# Patient Record
Sex: MALE | Race: WHITE | NOT HISPANIC OR LATINO | Employment: OTHER | URBAN - METROPOLITAN AREA
[De-identification: names, ages, dates, MRNs, and addresses within clinical notes are randomized per-mention and may not be internally consistent; named-entity substitution may affect disease eponyms.]

---

## 2017-06-28 ENCOUNTER — HOSPITAL ENCOUNTER (OUTPATIENT)
Dept: RADIOLOGY | Facility: HOSPITAL | Age: 62
Discharge: HOME | End: 2017-06-28
Attending: FAMILY MEDICINE
Payer: OTHER GOVERNMENT

## 2017-06-28 ENCOUNTER — OFFICE VISIT (OUTPATIENT)
Dept: INTERNAL MEDICINE | Facility: CLINIC | Age: 62
End: 2017-06-28
Payer: OTHER GOVERNMENT

## 2017-06-28 VITALS
DIASTOLIC BLOOD PRESSURE: 80 MMHG | BODY MASS INDEX: 33.55 KG/M2 | TEMPERATURE: 98 F | HEIGHT: 71 IN | SYSTOLIC BLOOD PRESSURE: 110 MMHG | HEART RATE: 71 BPM | OXYGEN SATURATION: 95 % | WEIGHT: 239.63 LBS

## 2017-06-28 DIAGNOSIS — M54.9 UPPER BACK PAIN ON LEFT SIDE: ICD-10-CM

## 2017-06-28 DIAGNOSIS — R20.2 PARESTHESIA OF LEFT ARM: ICD-10-CM

## 2017-06-28 DIAGNOSIS — L72.3 SEBACEOUS CYST: ICD-10-CM

## 2017-06-28 DIAGNOSIS — R20.2 PARESTHESIA OF LEFT ARM: Primary | ICD-10-CM

## 2017-06-28 PROCEDURE — 99214 OFFICE O/P EST MOD 30 MIN: CPT | Mod: S$PBB,,, | Performed by: FAMILY MEDICINE

## 2017-06-28 PROCEDURE — 99999 PR PBB SHADOW E&M-EST. PATIENT-LVL III: CPT | Mod: PBBFAC,,, | Performed by: FAMILY MEDICINE

## 2017-06-28 PROCEDURE — 72040 X-RAY EXAM NECK SPINE 2-3 VW: CPT | Mod: 26,,, | Performed by: RADIOLOGY

## 2017-06-28 PROCEDURE — 72040 X-RAY EXAM NECK SPINE 2-3 VW: CPT | Mod: TC

## 2017-06-28 PROCEDURE — 99213 OFFICE O/P EST LOW 20 MIN: CPT | Mod: PBBFAC,25 | Performed by: FAMILY MEDICINE

## 2017-06-28 RX ORDER — CYCLOBENZAPRINE HCL 10 MG
10 TABLET ORAL 3 TIMES DAILY PRN
Qty: 30 TABLET | Refills: 0 | Status: SHIPPED | OUTPATIENT
Start: 2017-06-28 | End: 2017-06-28 | Stop reason: SDUPTHER

## 2017-06-28 RX ORDER — NAPROXEN 500 MG/1
500 TABLET ORAL 2 TIMES DAILY WITH MEALS
Qty: 28 TABLET | Refills: 0 | Status: SHIPPED | OUTPATIENT
Start: 2017-06-28 | End: 2017-06-28 | Stop reason: SDUPTHER

## 2017-06-28 RX ORDER — CYCLOBENZAPRINE HCL 10 MG
10 TABLET ORAL 3 TIMES DAILY PRN
Qty: 30 TABLET | Refills: 0 | Status: SHIPPED | OUTPATIENT
Start: 2017-06-28 | End: 2017-07-08

## 2017-06-28 RX ORDER — MUPIROCIN 20 MG/G
OINTMENT TOPICAL 3 TIMES DAILY
Qty: 22 G | Refills: 0 | Status: SHIPPED | OUTPATIENT
Start: 2017-06-28 | End: 2017-06-28 | Stop reason: SDUPTHER

## 2017-06-28 RX ORDER — NAPROXEN 500 MG/1
500 TABLET ORAL 2 TIMES DAILY WITH MEALS
Qty: 28 TABLET | Refills: 0 | Status: SHIPPED | OUTPATIENT
Start: 2017-06-28 | End: 2017-07-12

## 2017-06-28 RX ORDER — MUPIROCIN 20 MG/G
OINTMENT TOPICAL 3 TIMES DAILY
Qty: 22 G | Refills: 0 | Status: SHIPPED | OUTPATIENT
Start: 2017-06-28

## 2017-06-30 NOTE — PROGRESS NOTES
"Subjective:       Patient ID: Grady Meza is a 61 y.o. male.    Chief Complaint: Shoulder Pain    Patient presents to clinic today reporting 1 month history of left shoulder pain (he indicates left upper back area). He has also noted paresthesias in the left arm. He states heat has helped, he has also tried TENS unit. He denies neck pain. He also reports "knot" on right buttock for last 3 months. He was able to squeeze blood tinged matter and states it is now smaller, but still present.      Review of Systems   Constitutional: Negative for chills, fatigue, fever and unexpected weight change.   Eyes: Negative for visual disturbance.   Respiratory: Negative for shortness of breath.    Cardiovascular: Negative for chest pain.   Musculoskeletal: Positive for arthralgias and myalgias. Negative for neck pain.   Neurological: Negative for headaches.       Objective:      Physical Exam   Constitutional: He appears well-developed and well-nourished. No distress.   HENT:   Head: Normocephalic and atraumatic.   Neck: Normal range of motion. Neck supple. Muscular tenderness present. No spinous process tenderness present. No neck rigidity. Normal range of motion present.       Skin:        Vitals reviewed.      Assessment:       1. Paresthesia of left arm    2. Upper back pain on left side    3. Sebaceous cyst        Plan:   Grady was seen today for shoulder pain.    Diagnoses and all orders for this visit:    Paresthesia of left arm  -     X-Ray Cervical Spine AP And Lateral; Future  -     Discontinue: naproxen (NAPROSYN) 500 MG tablet; Take 1 tablet (500 mg total) by mouth 2 (two) times daily with meals.  -     Discontinue: cyclobenzaprine (FLEXERIL) 10 MG tablet; Take 1 tablet (10 mg total) by mouth 3 (three) times daily as needed for Muscle spasms. Do not drive with this med  -     cyclobenzaprine (FLEXERIL) 10 MG tablet; Take 1 tablet (10 mg total) by mouth 3 (three) times daily as needed for Muscle spasms. Do not drive " with this med  -     naproxen (NAPROSYN) 500 MG tablet; Take 1 tablet (500 mg total) by mouth 2 (two) times daily with meals.    Upper back pain on left side  -     X-Ray Cervical Spine AP And Lateral; Future  -     Discontinue: naproxen (NAPROSYN) 500 MG tablet; Take 1 tablet (500 mg total) by mouth 2 (two) times daily with meals.  -     Discontinue: cyclobenzaprine (FLEXERIL) 10 MG tablet; Take 1 tablet (10 mg total) by mouth 3 (three) times daily as needed for Muscle spasms. Do not drive with this med  -     cyclobenzaprine (FLEXERIL) 10 MG tablet; Take 1 tablet (10 mg total) by mouth 3 (three) times daily as needed for Muscle spasms. Do not drive with this med  -     naproxen (NAPROSYN) 500 MG tablet; Take 1 tablet (500 mg total) by mouth 2 (two) times daily with meals.    Sebaceous cyst  -     Discontinue: mupirocin (BACTROBAN) 2 % ointment; Apply topically 3 (three) times daily.  -     mupirocin (BACTROBAN) 2 % ointment; Apply topically 3 (three) times daily.    - advised to follow up if any of the above is worse/persistent

## 2017-09-15 ENCOUNTER — LAB VISIT (OUTPATIENT)
Dept: LAB | Facility: HOSPITAL | Age: 62
End: 2017-09-15
Attending: FAMILY MEDICINE
Payer: OTHER GOVERNMENT

## 2017-09-15 DIAGNOSIS — Z13.29 THYROID DISORDER SCREEN: ICD-10-CM

## 2017-09-15 DIAGNOSIS — Z00.00 ROUTINE GENERAL MEDICAL EXAMINATION AT A HEALTH CARE FACILITY: ICD-10-CM

## 2017-09-15 DIAGNOSIS — Z13.220 SCREENING FOR LIPOID DISORDERS: ICD-10-CM

## 2017-09-15 LAB
ALBUMIN SERPL BCP-MCNC: 3.8 G/DL
ALP SERPL-CCNC: 72 U/L
ALT SERPL W/O P-5'-P-CCNC: 16 U/L
ANION GAP SERPL CALC-SCNC: 9 MMOL/L
AST SERPL-CCNC: 16 U/L
BILIRUB SERPL-MCNC: 0.7 MG/DL
BUN SERPL-MCNC: 24 MG/DL
CALCIUM SERPL-MCNC: 9.2 MG/DL
CHLORIDE SERPL-SCNC: 107 MMOL/L
CHOLEST SERPL-MCNC: 202 MG/DL
CHOLEST/HDLC SERPL: 6.1 {RATIO}
CO2 SERPL-SCNC: 24 MMOL/L
CREAT SERPL-MCNC: 0.9 MG/DL
EST. GFR  (AFRICAN AMERICAN): >60 ML/MIN/1.73 M^2
EST. GFR  (NON AFRICAN AMERICAN): >60 ML/MIN/1.73 M^2
GLUCOSE SERPL-MCNC: 100 MG/DL
HDLC SERPL-MCNC: 33 MG/DL
HDLC SERPL: 16.3 %
LDLC SERPL CALC-MCNC: 132.8 MG/DL
NONHDLC SERPL-MCNC: 169 MG/DL
POTASSIUM SERPL-SCNC: 4.4 MMOL/L
PROT SERPL-MCNC: 7.3 G/DL
SODIUM SERPL-SCNC: 140 MMOL/L
TRIGL SERPL-MCNC: 181 MG/DL
TSH SERPL DL<=0.005 MIU/L-ACNC: 1.78 UIU/ML

## 2017-09-15 PROCEDURE — 85025 COMPLETE CBC W/AUTO DIFF WBC: CPT

## 2017-09-15 PROCEDURE — 80061 LIPID PANEL: CPT

## 2017-09-15 PROCEDURE — 80053 COMPREHEN METABOLIC PANEL: CPT

## 2017-09-15 PROCEDURE — 84443 ASSAY THYROID STIM HORMONE: CPT

## 2017-09-15 PROCEDURE — 36415 COLL VENOUS BLD VENIPUNCTURE: CPT

## 2017-09-16 LAB
BASOPHILS # BLD AUTO: 0.03 K/UL
BASOPHILS NFR BLD: 0.4 %
DIFFERENTIAL METHOD: NORMAL
EOSINOPHIL # BLD AUTO: 0.1 K/UL
EOSINOPHIL NFR BLD: 0.7 %
ERYTHROCYTE [DISTWIDTH] IN BLOOD BY AUTOMATED COUNT: 13.3 %
HCT VFR BLD AUTO: 45.1 %
HGB BLD-MCNC: 15.5 G/DL
LYMPHOCYTES # BLD AUTO: 1.8 K/UL
LYMPHOCYTES NFR BLD: 21.3 %
MCH RBC QN AUTO: 30 PG
MCHC RBC AUTO-ENTMCNC: 34.4 G/DL
MCV RBC AUTO: 87 FL
MONOCYTES # BLD AUTO: 0.8 K/UL
MONOCYTES NFR BLD: 8.8 %
NEUTROPHILS # BLD AUTO: 5.9 K/UL
NEUTROPHILS NFR BLD: 68.8 %
PLATELET # BLD AUTO: 249 K/UL
PMV BLD AUTO: 10 FL
RBC # BLD AUTO: 5.16 M/UL
WBC # BLD AUTO: 8.53 K/UL

## 2017-09-21 ENCOUNTER — OFFICE VISIT (OUTPATIENT)
Dept: INTERNAL MEDICINE | Facility: CLINIC | Age: 62
End: 2017-09-21
Payer: OTHER GOVERNMENT

## 2017-09-21 VITALS
HEART RATE: 84 BPM | TEMPERATURE: 98 F | DIASTOLIC BLOOD PRESSURE: 76 MMHG | SYSTOLIC BLOOD PRESSURE: 120 MMHG | BODY MASS INDEX: 34.32 KG/M2 | HEIGHT: 71 IN | WEIGHT: 245.13 LBS

## 2017-09-21 DIAGNOSIS — Z00.00 ROUTINE GENERAL MEDICAL EXAMINATION AT A HEALTH CARE FACILITY: Primary | ICD-10-CM

## 2017-09-21 DIAGNOSIS — E78.2 MIXED HYPERLIPIDEMIA: ICD-10-CM

## 2017-09-21 DIAGNOSIS — H04.123 DRY EYES, BILATERAL: ICD-10-CM

## 2017-09-21 PROCEDURE — 99213 OFFICE O/P EST LOW 20 MIN: CPT | Mod: PBBFAC | Performed by: FAMILY MEDICINE

## 2017-09-21 PROCEDURE — 99396 PREV VISIT EST AGE 40-64: CPT | Mod: S$PBB,,, | Performed by: FAMILY MEDICINE

## 2017-09-21 PROCEDURE — 99999 PR PBB SHADOW E&M-EST. PATIENT-LVL III: CPT | Mod: PBBFAC,,, | Performed by: FAMILY MEDICINE

## 2017-09-21 NOTE — PROGRESS NOTES
Subjective:       Patient ID: Grady Meza is a 61 y.o. male.    Chief Complaint: Annual Exam    Patient presents to clinic today for annual physical exam.      Review of Systems   Constitutional: Negative for chills, fatigue, fever and unexpected weight change.   HENT: Negative for congestion, dental problem, ear pain, hearing loss, rhinorrhea and trouble swallowing.    Eyes: Positive for pain (occasional, improving after starting OTC eye drop called Can C). Negative for visual disturbance.   Respiratory: Negative for cough and shortness of breath.    Cardiovascular: Negative for chest pain, palpitations and leg swelling.   Gastrointestinal: Positive for blood in stool (occasionally with hard stools) and constipation (sometimes). Negative for abdominal distention, abdominal pain, diarrhea, nausea and vomiting.   Genitourinary: Negative for difficulty urinating, scrotal swelling and testicular pain.   Musculoskeletal: Positive for arthralgias (sometimes). Negative for myalgias.   Skin: Negative for rash.   Neurological: Negative for dizziness, weakness, numbness and headaches.   Hematological: Negative for adenopathy. Does not bruise/bleed easily.   Psychiatric/Behavioral: Negative for dysphoric mood and sleep disturbance. The patient is not nervous/anxious.        Objective:      Physical Exam   Constitutional: He is oriented to person, place, and time. He appears well-developed and well-nourished. No distress.   HENT:   Head: Normocephalic and atraumatic.   Right Ear: Hearing, tympanic membrane, external ear and ear canal normal.   Left Ear: Hearing, tympanic membrane, external ear and ear canal normal.   Nose: Nose normal.   Mouth/Throat: Uvula is midline, oropharynx is clear and moist and mucous membranes are normal.   Eyes: Conjunctivae, EOM and lids are normal. Pupils are equal, round, and reactive to light. No scleral icterus.   Neck: Normal range of motion. Neck supple. No thyromegaly present.    Cardiovascular: Normal rate and regular rhythm.  Exam reveals no gallop and no friction rub.    No murmur heard.  Pulmonary/Chest: Effort normal and breath sounds normal. He has no wheezes. He has no rales.   Abdominal: Soft. Bowel sounds are normal. He exhibits no distension and no mass. There is no hepatosplenomegaly. There is no tenderness.   Musculoskeletal: Normal range of motion. He exhibits no edema or tenderness.   Lymphadenopathy:     He has no cervical adenopathy.   Neurological: He is alert and oriented to person, place, and time. No cranial nerve deficit. Coordination normal.   Reflex Scores:       Patellar reflexes are 2+ on the right side and 2+ on the left side.  Skin: Skin is warm and dry. No rash noted.   Psychiatric: He has a normal mood and affect.   Vitals reviewed.      Assessment:       1. Routine general medical examination at a health care facility    2. Mixed hyperlipidemia    3. Dry eyes, bilateral        Plan:     Problem List Items Addressed This Visit     Mixed hyperlipidemia    Current Assessment & Plan     Mild, advised weight loss through healthy diet and exercise to improve, recheck lab in 6 months           Other Visit Diagnoses     Routine general medical examination at a health care facility    -  Primary    Dry eyes, bilateral        Relevant Orders    Ambulatory referral to Optometry          Health Maintenance reviewed/updated.

## 2017-09-21 NOTE — PATIENT INSTRUCTIONS
Dr. Porsha Gabriel, Optometry  [address] [place] [address]   [phone]   fax: [phone]    DIET: LOW CHOLESTEROL  Cholesterol is needed by the body to build new cells and create certain hormones. There are two kinds of cholesterol in the blood:      · Good cholesterol prevents fat deposits (plaque) from building up in the arteries. In this way it protects against heart disease and stroke.    · Bad cholesterol stays in the body and sticks to artery walls. It may eventually block blood flow to the heart and brain causing heart attack or stroke.   75% of the bodys cholesterol is made in the liver. Only 25% of the bodys cholesterol comes from the food you eat. While the amount of cholesterol in your diet should be limited, it is the cholesterol that your body makes that creates the greatest disease risk. The biggest influence on cholesterol made by your body is the mixture of fat types in your diet. There are two kinds of fats you can eat:  · Good Fats are the unsaturated fats (mono-saturated and poly-unsaturated). They raise the level of good cholesterol and lower the level of bad cholesterol. Good fats are found in vegetable oils such as olive, sunflower, corn and soybean oils, and in nuts and seeds.    · Bad Fats are the saturated fats (including foods high in cholesterol) and trans fats. These increase the risk of disease. They lower the good cholesterol and raise the level of bad cholesterol. Bad fats are found in meat and whole-milk dairy products. Some plants are also high in bad fats (coconut and palm plants). Trans fats are found in hard (stick) margarines and many fast foods and commercially baked goods. Soft margarine sold in tubs has less trans fats and are safer to use.  High blood cholesterol usually is a result of a diet high in saturated fat combined with an inactive lifestyle. In some cases, genetics plays a role in causing high cholesterol. The following tips  will help you create healthy eating habits that will help lower your blood cholesterol level.  STEPS TO CREATING A DIET HIGH IN GOOD FAT, LOW IN BAD FATS (and low in cholesterol)  1. Consult with your doctor before starting a low cholesterol diet or weight loss program.    2. Learn to read nutrition labels and select appropriate portion sizes.    3. When cooking, use plant-based unsaturated vegetable oils (sunflower, corn, soybean, canola, peanut, and olive oils).    4. Avoid saturated oils found in animal products such as meat, dairy (whole-milk, cheese and ice cream), poultry skin, and egg yolks. Plants high in saturated oils include coconut and coconut oil, palm oil and palm kernel oil.  5. If you eat meat, choose smaller portions and lean cuts.  6. Replace meat with fish at least two times a week. Fish is an important source of the unsaturated fat called omega-3 fatty acids. This fat has potential to lower the risk of heart disease.    7. Replace whole-milk dairy products with low-fat or nonfat products. Try soy products. Soy helps to reduce total cholesterol.    8. Supplement your diet with protective fibers. Eat nuts, seeds, and whole grains rather than white rice and bread. These foods lower both cholesterol and triglyceride levels. (Triglycerides are another fat found in the blood.) Walnuts are one of the best sources of an omega-3 fatty acid.  9. Eat plenty of fresh fruits and vegetables daily.  10. Avoid fast foods and commercial baked goods. Assume they contain saturated fat unless labeled otherwise.  © 1408-1835 [employer] [facility], [address] [place] [address], [place] [address]. All rights reserved. This information is not intended as a substitute for professional medical care. Always follow your healthcare professional's instructions.

## 2017-09-25 PROBLEM — E78.2 MIXED HYPERLIPIDEMIA: Status: ACTIVE | Noted: 2017-09-25

## 2018-07-10 ENCOUNTER — TELEPHONE (OUTPATIENT)
Dept: OTOLARYNGOLOGY | Facility: CLINIC | Age: 63
End: 2018-07-10

## 2018-07-10 NOTE — TELEPHONE ENCOUNTER
----- Message from Jes Cummings sent at 7/10/2018 12:14 PM CDT -----  Contact: Derrick,Sloane Spouse             Name of Who is Calling: Derrick,Sloane Spouse       What is the request in detail: Pt is an old pt of Dr. Sheridan but new to [facility] and is experiencing ringing in ears and extremely dizzy.  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   Pt would like to be seen sooner that August 17 and is on the waiting list.  Please contact pt to further discuss and advise.      Can the clinic reply by [facility]: no      What Number to Call Back if not in [facility]: [phone]

## 2018-07-12 ENCOUNTER — TELEPHONE (OUTPATIENT)
Dept: OTOLARYNGOLOGY | Facility: CLINIC | Age: 63
End: 2018-07-12

## 2018-07-12 ENCOUNTER — OFFICE VISIT (OUTPATIENT)
Dept: OTOLARYNGOLOGY | Facility: CLINIC | Age: 63
End: 2018-07-12
Payer: OTHER GOVERNMENT

## 2018-07-12 ENCOUNTER — CLINICAL SUPPORT (OUTPATIENT)
Dept: OTOLARYNGOLOGY | Facility: CLINIC | Age: 63
End: 2018-07-12
Payer: OTHER GOVERNMENT

## 2018-07-12 VITALS
WEIGHT: 247.31 LBS | TEMPERATURE: 98 F | SYSTOLIC BLOOD PRESSURE: 119 MMHG | DIASTOLIC BLOOD PRESSURE: 74 MMHG | HEIGHT: 71 IN | BODY MASS INDEX: 34.62 KG/M2 | HEART RATE: 84 BPM

## 2018-07-12 DIAGNOSIS — R42 DIZZINESS: ICD-10-CM

## 2018-07-12 DIAGNOSIS — H93.11 TINNITUS OF RIGHT EAR: Primary | ICD-10-CM

## 2018-07-12 DIAGNOSIS — H90.A21 SENSORINEURAL HEARING LOSS (SNHL) OF RIGHT EAR WITH RESTRICTED HEARING OF LEFT EAR: ICD-10-CM

## 2018-07-12 DIAGNOSIS — H90.3 ASYMMETRICAL SENSORINEURAL HEARING LOSS: Primary | ICD-10-CM

## 2018-07-12 DIAGNOSIS — J30.9 ALLERGIC RHINITIS, UNSPECIFIED SEASONALITY, UNSPECIFIED TRIGGER: ICD-10-CM

## 2018-07-12 DIAGNOSIS — H69.93 DYSFUNCTION OF BOTH EUSTACHIAN TUBES: ICD-10-CM

## 2018-07-12 DIAGNOSIS — R42 VERTIGO: ICD-10-CM

## 2018-07-12 DIAGNOSIS — J34.2 NASAL SEPTAL DEVIATION: ICD-10-CM

## 2018-07-12 DIAGNOSIS — H93.11 TINNITUS, RIGHT EAR: ICD-10-CM

## 2018-07-12 PROCEDURE — 99214 OFFICE O/P EST MOD 30 MIN: CPT | Mod: S$GLB,,, | Performed by: SPECIALIST

## 2018-07-12 PROCEDURE — 92557 COMPREHENSIVE HEARING TEST: CPT | Mod: S$GLB,,, | Performed by: AUDIOLOGIST-HEARING AID FITTER

## 2018-07-12 PROCEDURE — 92550 TYMPANOMETRY & REFLEX THRESH: CPT | Mod: S$GLB,,, | Performed by: AUDIOLOGIST-HEARING AID FITTER

## 2018-07-12 NOTE — PATIENT INSTRUCTIONS
It is recommended that Mr. Meza:   Receive binaural hearing aids to improve speech understanding, pending medical clearance from a physician.   Continue to receive annual audiograms.   Use precaution and/or hearing protection in noisy environments.

## 2018-07-12 NOTE — Clinical Note
Dr. Sheridan,  Thank you for referring Mr. Meza to me for an audiological evaluation.  Please see the enclosed assessment and audiogram.  It is recommended that Mr. Meza proceed with binaural hearing aids to improve his speech understanding.  It is my opinion that he is a good candidate and would do well with a hearing aid trial.  Pending medical clearance, if I can assist him with this in any way, please let me know.  Thank you, Shiv Holguin, CCC-A

## 2018-07-12 NOTE — PROGRESS NOTES
"  Shiv Holguin, CCC-A  Audiologist - [facility] [address] [address]  [email or web address]  [phone]    Patient: Grady Meza   MRN: [id number]  : 1955  GUTIERREZ: 2018       AUDIOLOGICAL EVALUATION    CASE HISTORY:    Grady Meza, 62 y.o., was seen on the above date for an audiological evaluation, per Dr. Sheridan's orders during today's office visit.   Patient reported a long-standing history of bilateral high frequency hearing loss that seems to have worsened in the right ear lately.  He stated that his last audiological evaluation was approximately 8-10 years ago.  He noted the presence of a "high frequency buzz" type tinnitus only in the right ear, as long as he can recall.  He described intermittent, but recurring, episodes of dizziness.  He vaguely recalled a history of these episodes, but they are occurring more frequently now.  He recounted somewhat weekly episodes, but will begin to keep a log for more accurate reporting.  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  He stated that it is very important for him to be able to communicate effectively, not only at work, but at home with his wife as well.  He denied any previous use of hearing aids.  No further history significant to hearing loss was reported.    TEST RESULTS:    -Immittance testing revealed normal middle ear compliance (Type A tympanograms) in both ears.  -Speech reception thresholds (SRT) were obtained at 40 dB HL and 10 dB HL, in the right and left ears, respectively, which was consistent with puretone results.   -A word recognition (WR) score of 48% was obtained in the right ear using a presentation level of 65 dB HL, with masking in the left ear at 45 dB HL.  A left ear word recognition score of 80% correct was obtained using a presentation level of 50 dB HL.     IMPRESSION:   Audiological " testing indicated that Grady Meza has normal hearing in the low frequencies sloping to a severe sensorineural hearing loss in the right ear.  The left ear revealed normal hearing in the low frequencies sloping to a moderately severe sensorineural hearing loss.    RECOMMENDATIONS:   It is recommended that he:  Follow up medically with a physician to assess asymmetrical hearing loss, tinnitus, and dizziness.  Receive binaural hearing aids to improve speech understanding, pending medical clearance from a physician.  Continue to receive audiological monitoring annually.  Use precaution and/or hearing protection in noisy environments.    If you should have any questions or concerns regarding the above information, please do not hesitate to contact me at [phone].      _______________________________  Shiv Holguin, CCC-A  Audiologist    enclosure: audiogram

## 2018-07-12 NOTE — TELEPHONE ENCOUNTER
----- Message from Jes Cummings sent at 7/12/2018  4:43 PM CDT -----  Contact: HORTENSIASOLO MENA [[id number]]            Name of Who is Calling: SOLO BAZAN [[id number]]    What is the request in detail: Pt is calling to discuss his visit with regards to getting a hearing aid.  Pt is wanting to know what type he should look into getting.  Pt says that he's looking at [facility]. Please contact pt to further discuss and advise.      Can the clinic reply by [facility]: Yes      What Number to Call Back if not in [facility]: [phone]

## 2018-07-12 NOTE — TELEPHONE ENCOUNTER
Call pt to infrom him that the audiogist is gone for the day and will give him a call on tomorrow.

## 2018-07-13 ENCOUNTER — TELEPHONE (OUTPATIENT)
Dept: INTERNAL MEDICINE | Facility: CLINIC | Age: 63
End: 2018-07-13

## 2018-07-13 ENCOUNTER — TELEPHONE (OUTPATIENT)
Dept: OTOLARYNGOLOGY | Facility: CLINIC | Age: 63
End: 2018-07-13

## 2018-07-13 NOTE — TELEPHONE ENCOUNTER
----- Message from Denisha Ma MA sent at 7/13/2018 10:48 AM CDT -----  Contact: pt      ----- Message -----  From: Reyna Espino  Sent: 7/13/2018   9:28 AM  To: Arvin Edwards Staff              Name of Who is Calling: SOLO BAZAN [[id number]]      What is the request in detail: pt is calling for recommendations for a hearing aid.. Please advise      Can the clinic reply by [facility]: no    What [facility]: [phone]

## 2018-07-13 NOTE — TELEPHONE ENCOUNTER
Spoke with patient, he wants to buy hearing aids from [facility] this weekend.  Requesting copy of his audiogram completed yesterday.  Gave number for medical records [phone].    ----- Message from Denisha Ma MA sent at 7/12/2018  4:55 PM CDT -----  Contact: SOLO BAZAN [[id number]]      ----- Message -----  From: Jes Cummings  Sent: 7/12/2018   4:43 PM  To: Arvin Edwards Staff              Name of Who is Calling: SOLO BAZAN [[id number]]    What is the request in detail: Pt is calling to discuss his visit with regards to getting a hearing aid.  Pt is wanting to know what type he should look into getting.  Pt says that he's looking at [facility]. Please contact pt to further discuss and advise.      Can the clinic reply by [facility]: Yes      What Number to Call Back [facility] not [facility]: [phone]

## 2018-07-13 NOTE — TELEPHONE ENCOUNTER
----- Message from Juana Borjas sent at 7/13/2018 12:41 PM CDT -----  Contact: self/[phone]  Would like to consult with nurse regarding hearing test result, please call back at [phone]. Thanks/ar

## 2018-07-13 NOTE — TELEPHONE ENCOUNTER
----- Message from Sloane Sorenson sent at 7/13/2018  1:55 PM CDT -----  Contact: pt  Returning a call.

## 2018-07-13 NOTE — PROGRESS NOTES
Subjective:       Patient ID: Grady Meza is a 62 y.o. male.    Chief Complaint: Dizziness (with ringing in ear with some hearing loss)    The patient is coming in to discuss hearing related problems.  He has had tinnitus in his right ear for years.  He has hearing loss bilaterally but is worse in the right. He has a history of recurring episodes of dizziness when he is looking up or rapidly moving his head.  These episodes typically last 5-10 seconds.  There is no associated nausea or vomiting.  He is having nasal congestion and postnasal drip.  Nasal secretions or green.  This process is been ongoing intermittently for several months.  Currently secretions are clear.  He is not having fever.      Review of Systems   Constitutional: Negative for activity change, appetite change, chills, fatigue, fever and unexpected weight change.   HENT: Positive for congestion, ear pain, hearing loss, postnasal drip, rhinorrhea, sinus pain, sinus pressure, sore throat and tinnitus. Negative for ear discharge, facial swelling, mouth sores, sneezing, trouble swallowing and voice change.    Eyes: Negative for photophobia, pain, discharge, redness, itching and visual disturbance.   Respiratory: Positive for cough. Negative for apnea, choking, shortness of breath and wheezing.    Cardiovascular: Negative for chest pain and palpitations.   Gastrointestinal: Negative for abdominal distention, abdominal pain, nausea and vomiting.   Musculoskeletal: Negative for arthralgias, myalgias, neck pain and neck stiffness.   Skin: Negative.  Negative for color change, pallor and rash.   Allergic/Immunologic: Negative for environmental allergies, food allergies and immunocompromised state.   Neurological: Positive for headaches. Negative for dizziness, facial asymmetry, speech difficulty, weakness, light-headedness and numbness.   Hematological: Negative for adenopathy. Does not bruise/bleed easily.   Psychiatric/Behavioral: Negative for  agitation, confusion, decreased concentration and sleep disturbance.       Objective:      Physical Exam   Constitutional: He is oriented to person, place, and time. He appears well-developed and well-nourished. He is cooperative.   HENT:   Head: Normocephalic.   Right Ear: External ear and ear canal normal. Tympanic membrane is retracted. Tympanic membrane mobility is abnormal.   Left Ear: External ear and ear canal normal. Tympanic membrane is retracted. Tympanic membrane mobility is abnormal.   Nose: Mucosal edema (cyanotic, boggy inferior turbinates bilaterally), rhinorrhea (clear mucus bilaterally) and septal deviation (to the right) present.   Mouth/Throat: Uvula is midline, oropharynx is clear and moist and mucous membranes are normal. No oral lesions.   Eyes: EOM and lids are normal. Pupils are equal, round, and reactive to light. Right eye exhibits no discharge and no exudate. Left eye exhibits no discharge and no exudate. Right conjunctiva is injected. Left conjunctiva is injected.   Neck: Trachea normal and normal range of motion. No muscular tenderness present. No tracheal deviation present. No thyroid mass and no thyromegaly present.   Cardiovascular: Normal rate, regular rhythm, normal heart sounds and normal pulses.    Pulmonary/Chest: Effort normal and breath sounds normal. No stridor. He has no decreased breath sounds. He has no wheezes. He has no rhonchi. He has no rales.   Abdominal: Soft. Bowel sounds are normal. There is no tenderness.   Musculoskeletal: Normal range of motion.   Lymphadenopathy:        Head (right side): No submental, no submandibular, no preauricular, no posterior auricular and no occipital adenopathy present.        Head (left side): No submental, no submandibular, no preauricular, no posterior auricular and no occipital adenopathy present.     He has no cervical adenopathy.   Neurological: He is alert and oriented to person, place, and time. He has normal strength. No  cranial nerve deficit or sensory deficit. Gait normal.   Skin: Skin is warm and dry. No petechiae and no rash noted. No cyanosis. Nails show no clubbing.   Psychiatric: He has a normal mood and affect. His speech is normal and behavior is normal. Judgment and thought content normal. Cognition and memory are normal.       Assessment:       1. Tinnitus of right ear    2. Sensorineural hearing loss (SNHL) of right ear with restricted hearing of left ear    3. Dysfunction of both eustachian tubes    4. Allergic rhinitis, unspecified seasonality, unspecified trigger    5. Nasal septal deviation    6. Vertigo        Plan:       MRI of IAC's   with and without IV contrast. [facility]. Recheck 1 year with audioogic evaluation prir to seeing me. Noise protection.  I will also schedule him for a VNG.

## 2018-07-13 NOTE — TELEPHONE ENCOUNTER
----- Message from Bessy White sent at 7/13/2018  2:46 PM CDT -----  Contact: pt  He's calling in regards to results for hearing test ,pls call pt back at [phone] (hile)

## 2018-07-13 NOTE — TELEPHONE ENCOUNTER
----- Message from Reyna Espino sent at 7/13/2018  9:28 AM CDT -----  Contact: pt            Name of Who is Calling: SOLO BAZAN [[id number]]      What is the request in detail: pt is calling for recommendations for a hearing aid.. Please advise      Can the clinic reply by [facility]: no    What Number to Call Back if not in [facility]: [phone]

## 2018-07-13 NOTE — TELEPHONE ENCOUNTER
Patient called back, does not want to go through medical records to get his audiogram.  Wants to know why it is not on the patient portal.  Offered patient to locate a number for someone who can assist him with the patient portal.  Patient says he has a number and will try to contact someone himself.

## 2018-07-19 ENCOUNTER — TELEPHONE (OUTPATIENT)
Dept: OTOLARYNGOLOGY | Facility: CLINIC | Age: 63
End: 2018-07-19

## 2018-07-19 ENCOUNTER — TELEPHONE (OUTPATIENT)
Dept: RADIOLOGY | Facility: HOSPITAL | Age: 63
End: 2018-07-19

## 2018-07-19 DIAGNOSIS — Z13.9 SCREENING FOR CONDITION: Primary | ICD-10-CM

## 2018-07-19 NOTE — TELEPHONE ENCOUNTER
----- Message from Kaykay Sen RT sent at 7/19/2018  8:42 AM CDT -----  Contact: RADIOLOGY  This patient is scheduled for a MRI w/contrast and We will need a recent creatinine for this patient (within the last 30 days). Please schedule patient for STAT labs 1 hr prior to radiology appointment and Please call the patient and inform patient of the changes. If you have any questions please contact Radiology at [phone].

## 2018-07-20 ENCOUNTER — HOSPITAL ENCOUNTER (OUTPATIENT)
Dept: RADIOLOGY | Facility: HOSPITAL | Age: 63
Discharge: HOME | End: 2018-07-20
Attending: SPECIALIST
Payer: OTHER GOVERNMENT

## 2018-07-20 DIAGNOSIS — H93.11 TINNITUS OF RIGHT EAR: ICD-10-CM

## 2018-07-20 PROCEDURE — A9585 GADOBUTROL INJECTION: HCPCS | Mod: PO | Performed by: SPECIALIST

## 2018-07-20 PROCEDURE — 70553 MRI BRAIN STEM W/O & W/DYE: CPT | Mod: TC,PO

## 2018-07-20 PROCEDURE — 70553 MRI BRAIN STEM W/O & W/DYE: CPT | Mod: 26,,, | Performed by: RADIOLOGY

## 2018-07-20 PROCEDURE — 25500020 PHARM REV CODE 255: Mod: PO | Performed by: SPECIALIST

## 2018-07-20 RX ORDER — GADOBUTROL 604.72 MG/ML
10 INJECTION INTRAVENOUS
Status: COMPLETED | OUTPATIENT
Start: 2018-07-20 | End: 2018-07-20

## 2018-07-20 RX ADMIN — GADOBUTROL 10 ML: 604.72 INJECTION INTRAVENOUS at 09:07

## 2018-07-20 NOTE — TELEPHONE ENCOUNTER
Call pt to inform him of the labs he would need to take before having his MRI done. Pt did not answer left VM with return message.

## 2018-07-23 ENCOUNTER — TELEPHONE (OUTPATIENT)
Dept: OTOLARYNGOLOGY | Facility: CLINIC | Age: 63
End: 2018-07-23

## 2018-08-02 ENCOUNTER — TELEPHONE (OUTPATIENT)
Dept: INTERNAL MEDICINE | Facility: CLINIC | Age: 63
End: 2018-08-02

## 2018-08-02 NOTE — TELEPHONE ENCOUNTER
I recommend he discuss that with Dr. Olvera. If I need to put in referral as PCP I just need to know who she recommends he see. Thank you.

## 2018-08-02 NOTE — TELEPHONE ENCOUNTER
Patient would like Dr Kate's recommendation of a surgeon for the carcinoma removal. Patient has seen Dr Yanes and has been researching Dr Farfan.

## 2018-08-02 NOTE — TELEPHONE ENCOUNTER
----- Message from Lucia Rain sent at 8/2/2018 12:53 PM CDT -----  Contact: self   Patient would like to consult with nurse regarding referral. Please call back at [phone].      Thanks,  Lucia Rain

## 2018-08-02 NOTE — TELEPHONE ENCOUNTER
Spoke to pt regarding initial biopsy pt stated Dr. 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

## 2018-09-20 ENCOUNTER — TELEPHONE (OUTPATIENT)
Dept: INTERNAL MEDICINE | Facility: CLINIC | Age: 63
End: 2018-09-20

## 2018-09-20 NOTE — TELEPHONE ENCOUNTER
----- Message from Lucia Rain sent at 9/20/2018 10:16 AM CDT -----  Contact: self   Requesting orders for annual. Please call back at [phone].    Thanks,  Lucia Rain

## 2018-09-26 ENCOUNTER — PATIENT OUTREACH (OUTPATIENT)
Dept: ADMINISTRATIVE | Facility: HOSPITAL | Age: 63
End: 2018-09-26

## 2018-09-28 ENCOUNTER — LAB VISIT (OUTPATIENT)
Dept: LAB | Facility: HOSPITAL | Age: 63
End: 2018-09-28
Attending: FAMILY MEDICINE
Payer: OTHER GOVERNMENT

## 2018-09-28 DIAGNOSIS — Z13.220 SCREENING FOR LIPOID DISORDERS: ICD-10-CM

## 2018-09-28 DIAGNOSIS — Z00.00 ROUTINE GENERAL MEDICAL EXAMINATION AT A HEALTH CARE FACILITY: ICD-10-CM

## 2018-09-28 DIAGNOSIS — Z13.29 THYROID DISORDER SCREEN: ICD-10-CM

## 2018-09-28 LAB
ALBUMIN SERPL BCP-MCNC: 4 G/DL
ALP SERPL-CCNC: 70 U/L
ALT SERPL W/O P-5'-P-CCNC: 12 U/L
ANION GAP SERPL CALC-SCNC: 8 MMOL/L
AST SERPL-CCNC: 20 U/L
BASOPHILS # BLD AUTO: 0.05 K/UL
BASOPHILS NFR BLD: 0.5 %
BILIRUB SERPL-MCNC: 1.1 MG/DL
BUN SERPL-MCNC: 13 MG/DL
CALCIUM SERPL-MCNC: 9.6 MG/DL
CHLORIDE SERPL-SCNC: 107 MMOL/L
CHOLEST SERPL-MCNC: 184 MG/DL
CHOLEST/HDLC SERPL: 6.1 {RATIO}
CO2 SERPL-SCNC: 25 MMOL/L
CREAT SERPL-MCNC: 1.1 MG/DL
DIFFERENTIAL METHOD: NORMAL
EOSINOPHIL # BLD AUTO: 0.2 K/UL
EOSINOPHIL NFR BLD: 1.6 %
ERYTHROCYTE [DISTWIDTH] IN BLOOD BY AUTOMATED COUNT: 13.1 %
EST. GFR  (AFRICAN AMERICAN): >60 ML/MIN/1.73 M^2
EST. GFR  (NON AFRICAN AMERICAN): >60 ML/MIN/1.73 M^2
GLUCOSE SERPL-MCNC: 101 MG/DL
HCT VFR BLD AUTO: 46.1 %
HDLC SERPL-MCNC: 30 MG/DL
HDLC SERPL: 16.3 %
HGB BLD-MCNC: 15.1 G/DL
IMM GRANULOCYTES # BLD AUTO: 0.03 K/UL
IMM GRANULOCYTES NFR BLD AUTO: 0.3 %
LDLC SERPL CALC-MCNC: 121.6 MG/DL
LYMPHOCYTES # BLD AUTO: 1.7 K/UL
LYMPHOCYTES NFR BLD: 18.5 %
MCH RBC QN AUTO: 29.4 PG
MCHC RBC AUTO-ENTMCNC: 32.8 G/DL
MCV RBC AUTO: 90 FL
MONOCYTES # BLD AUTO: 0.8 K/UL
MONOCYTES NFR BLD: 8.2 %
NEUTROPHILS # BLD AUTO: 6.5 K/UL
NEUTROPHILS NFR BLD: 70.9 %
NONHDLC SERPL-MCNC: 154 MG/DL
NRBC BLD-RTO: 0 /100 WBC
PLATELET # BLD AUTO: 305 K/UL
PMV BLD AUTO: 10 FL
POTASSIUM SERPL-SCNC: 4.3 MMOL/L
PROT SERPL-MCNC: 7.4 G/DL
RBC # BLD AUTO: 5.14 M/UL
SODIUM SERPL-SCNC: 140 MMOL/L
TRIGL SERPL-MCNC: 162 MG/DL
TSH SERPL DL<=0.005 MIU/L-ACNC: 2.06 UIU/ML
WBC # BLD AUTO: 9.2 K/UL

## 2018-09-28 PROCEDURE — 85025 COMPLETE CBC W/AUTO DIFF WBC: CPT

## 2018-09-28 PROCEDURE — 36415 COLL VENOUS BLD VENIPUNCTURE: CPT

## 2018-09-28 PROCEDURE — 80053 COMPREHEN METABOLIC PANEL: CPT

## 2018-09-28 PROCEDURE — 80061 LIPID PANEL: CPT

## 2018-09-28 PROCEDURE — 84443 ASSAY THYROID STIM HORMONE: CPT

## 2018-10-03 ENCOUNTER — OFFICE VISIT (OUTPATIENT)
Dept: INTERNAL MEDICINE | Facility: CLINIC | Age: 63
End: 2018-10-03
Payer: MEDICARE

## 2018-10-03 VITALS
WEIGHT: 245.56 LBS | BODY MASS INDEX: 34.25 KG/M2 | OXYGEN SATURATION: 97 % | HEART RATE: 95 BPM | TEMPERATURE: 99 F | DIASTOLIC BLOOD PRESSURE: 76 MMHG | SYSTOLIC BLOOD PRESSURE: 118 MMHG

## 2018-10-03 DIAGNOSIS — E78.2 MIXED HYPERLIPIDEMIA: Primary | ICD-10-CM

## 2018-10-03 DIAGNOSIS — Z12.11 COLON CANCER SCREENING: ICD-10-CM

## 2018-10-03 PROCEDURE — 99999 PR PBB SHADOW E&M-EST. PATIENT-LVL III: CPT | Mod: PBBFAC,,, | Performed by: FAMILY MEDICINE

## 2018-10-03 PROCEDURE — 99214 OFFICE O/P EST MOD 30 MIN: CPT | Mod: S$PBB,,, | Performed by: FAMILY MEDICINE

## 2018-10-03 PROCEDURE — 99213 OFFICE O/P EST LOW 20 MIN: CPT | Mod: PBBFAC | Performed by: FAMILY MEDICINE

## 2018-10-03 RX ORDER — SODIUM, POTASSIUM,MAG SULFATES 17.5-3.13G
SOLUTION, RECONSTITUTED, ORAL ORAL
Qty: 354 ML | Refills: 0 | Status: SHIPPED | OUTPATIENT
Start: 2018-10-03

## 2018-10-03 RX ORDER — SODIUM, POTASSIUM,MAG SULFATES 17.5-3.13G
SOLUTION, RECONSTITUTED, ORAL ORAL
Qty: 354 ML | Refills: 0 | Status: SHIPPED | OUTPATIENT
Start: 2018-10-03 | End: 2018-10-03 | Stop reason: SDUPTHER

## 2018-10-03 NOTE — PROGRESS NOTES
Subjective:       Patient ID: Grady Meza is a 62 y.o. male.    Chief Complaint: Annual Exam    Patient presents to clinic today for annual physical exam.      Review of Systems   Constitutional: Negative for activity change and unexpected weight change.   HENT: Positive for hearing loss (right ear worse; has seen ENT; working on getting hearing aids). Negative for rhinorrhea and trouble swallowing.    Eyes: Negative for discharge and visual disturbance.   Respiratory: Negative for chest tightness and wheezing.    Cardiovascular: Negative for chest pain and palpitations.   Gastrointestinal: Negative for blood in stool, constipation, diarrhea and vomiting.   Endocrine: Negative for polydipsia and polyuria.   Genitourinary: Negative for difficulty urinating, hematuria and urgency.   Musculoskeletal: Negative for arthralgias, joint swelling and neck pain.   Neurological: Negative for weakness and headaches.   Psychiatric/Behavioral: Negative for confusion and dysphoric mood.       Objective:      Physical Exam   Constitutional: He is oriented to person, place, and time. He appears well-developed and well-nourished. No distress.   HENT:   Head: Normocephalic and atraumatic.   Right Ear: Hearing, tympanic membrane, external ear and ear canal normal.   Left Ear: Hearing, tympanic membrane, external ear and ear canal normal.   Nose: Nose normal.   Mouth/Throat: Uvula is midline, oropharynx is clear and moist and mucous membranes are normal.   Eyes: Conjunctivae, EOM and lids are normal. Pupils are equal, round, and reactive to light. No scleral icterus.   Neck: Normal range of motion. Neck supple. No thyromegaly present.   Cardiovascular: Normal rate and regular rhythm. Exam reveals no gallop and no friction rub.   No murmur heard.  Pulmonary/Chest: Effort normal and breath sounds normal. He has no wheezes. He has no rales.   Abdominal: Soft. Bowel sounds are normal. He exhibits no distension and no mass. There is no  hepatosplenomegaly. There is no tenderness.   Musculoskeletal: Normal range of motion. He exhibits no edema or tenderness.   Lymphadenopathy:     He has no cervical adenopathy.   Neurological: He is alert and oriented to person, place, and time. No cranial nerve deficit. Coordination normal.   Reflex Scores:       Patellar reflexes are 2+ on the right side and 2+ on the left side.  Skin: Skin is warm and dry. No rash noted.   Psychiatric: He has a normal mood and affect.   Vitals reviewed.      Assessment:       1. Mixed hyperlipidemia    2. Colon cancer screening        Plan:     Problem List Items Addressed This Visit     Mixed hyperlipidemia - Primary    Current Assessment & Plan     Improved; ASCVD risk score 13.2 - discussed recommendations with patient on statin therapy; patient declines at this time but will consider; recheck labs in 6 months.           Other Visit Diagnoses     Colon cancer screening        Relevant Medications    sodium,potassium,mag sulfates (SUPREP BOWEL PREP KIT) 17.5-3.13-1.6 gram SolR    Other Relevant Orders    Case request GI: COLONOSCOPY (Completed)          Health Maintenance reviewed/updated. Declines flu and Tdap vaccines, expressed understanding or risks/benefits.    The 10-year ASCVD risk score (Karyshasha JORGENSEN Jr., et al., 2013) is: 13.2%    Values used to calculate the score:      Age: 62 years      Sex: Male      Is Non- : No      Diabetic: No      Tobacco smoker: No      Systolic Blood Pressure: 118 mmHg      Is BP treated: No      HDL Cholesterol: 30 mg/dL      Total Cholesterol: 184 mg/dL

## 2018-10-03 NOTE — ASSESSMENT & PLAN NOTE
Improved; ASCVD risk score 13.2 - discussed recommendations with patient on statin therapy; patient declines at this time but will consider; recheck labs in 6 months.

## 2018-10-03 NOTE — TELEPHONE ENCOUNTER
----- Message from Brandie Toure sent at 10/3/2018  9:48 AM CDT -----  Contact: Patient  Patient called and stated he was in today and his prescription for his Prep for his colonoscopy was sent to the [facility] and he didn't know that until he was close to home. He would like it sent to his regular local pharmacy.    [facility] [address] [place] [address] [place], [place] - [address] [address] [facility]  [address] [address] [address]  Phone: [phone] Fax: [phone]    He can be contacted at [phone].    Thanks,  Brandie

## 2018-10-03 NOTE — TELEPHONE ENCOUNTER
Pt requesting for bowel prep to be sent to his local pharmacy being that he wasn't informed of bowel prep was sent to [facility] and has now made it home.

## 2018-10-05 ENCOUNTER — DOCUMENTATION ONLY (OUTPATIENT)
Dept: ENDOSCOPY | Facility: HOSPITAL | Age: 63
End: 2018-10-05

## 2018-10-05 NOTE — PROGRESS NOTES
10/05/18 Pt called to schedule colonoscopy. Stated he already has Suprep. Instructions given, mailed and sent via my chart. Colonoscopy scheduled for 12/21/18.

## 2018-12-21 ENCOUNTER — HOSPITAL ENCOUNTER (OUTPATIENT)
Facility: HOSPITAL | Age: 63
Discharge: HOME | End: 2018-12-21
Attending: COLON & RECTAL SURGERY | Admitting: COLON & RECTAL SURGERY
Payer: MEDICARE

## 2018-12-21 ENCOUNTER — ANESTHESIA (OUTPATIENT)
Dept: ENDOSCOPY | Facility: HOSPITAL | Age: 63
End: 2018-12-21
Payer: MEDICARE

## 2018-12-21 ENCOUNTER — ANESTHESIA EVENT (OUTPATIENT)
Dept: ENDOSCOPY | Facility: HOSPITAL | Age: 63
End: 2018-12-21
Payer: MEDICARE

## 2018-12-21 VITALS
OXYGEN SATURATION: 96 % | SYSTOLIC BLOOD PRESSURE: 132 MMHG | HEART RATE: 82 BPM | DIASTOLIC BLOOD PRESSURE: 78 MMHG | BODY MASS INDEX: 34.02 KG/M2 | RESPIRATION RATE: 18 BRPM | TEMPERATURE: 98 F | HEIGHT: 71 IN | WEIGHT: 243 LBS

## 2018-12-21 DIAGNOSIS — Z12.11 SCREENING FOR COLON CANCER: ICD-10-CM

## 2018-12-21 PROCEDURE — 25000003 PHARM REV CODE 250: Performed by: COLON & RECTAL SURGERY

## 2018-12-21 PROCEDURE — 27201089 HC SNARE, DISP (ANY): Performed by: COLON & RECTAL SURGERY

## 2018-12-21 PROCEDURE — 45385 COLONOSCOPY W/LESION REMOVAL: CPT | Performed by: COLON & RECTAL SURGERY

## 2018-12-21 PROCEDURE — 25000003 PHARM REV CODE 250: Performed by: NURSE ANESTHETIST, CERTIFIED REGISTERED

## 2018-12-21 PROCEDURE — 88305 TISSUE SPECIMEN TO PATHOLOGY - SURGERY: ICD-10-PCS | Mod: 26,,, | Performed by: PATHOLOGY

## 2018-12-21 PROCEDURE — 88305 TISSUE EXAM BY PATHOLOGIST: CPT | Mod: 26,,, | Performed by: PATHOLOGY

## 2018-12-21 PROCEDURE — 37000008 HC ANESTHESIA 1ST 15 MINUTES: Performed by: COLON & RECTAL SURGERY

## 2018-12-21 PROCEDURE — 63600175 PHARM REV CODE 636 W HCPCS: Performed by: NURSE ANESTHETIST, CERTIFIED REGISTERED

## 2018-12-21 PROCEDURE — 88305 TISSUE EXAM BY PATHOLOGIST: CPT | Performed by: PATHOLOGY

## 2018-12-21 PROCEDURE — 37000009 HC ANESTHESIA EA ADD 15 MINS: Performed by: COLON & RECTAL SURGERY

## 2018-12-21 PROCEDURE — 45385 PR COLONOSCOPY,REMV LESN,SNARE: ICD-10-PCS | Mod: PT,,, | Performed by: COLON & RECTAL SURGERY

## 2018-12-21 PROCEDURE — 45385 COLONOSCOPY W/LESION REMOVAL: CPT | Mod: PT,,, | Performed by: COLON & RECTAL SURGERY

## 2018-12-21 RX ORDER — GLYCOPYRROLATE 0.2 MG/ML
INJECTION INTRAMUSCULAR; INTRAVENOUS
Status: DISCONTINUED | OUTPATIENT
Start: 2018-12-21 | End: 2018-12-21

## 2018-12-21 RX ORDER — LIDOCAINE HYDROCHLORIDE 20 MG/ML
INJECTION, SOLUTION EPIDURAL; INFILTRATION; INTRACAUDAL; PERINEURAL
Status: DISCONTINUED | OUTPATIENT
Start: 2018-12-21 | End: 2018-12-21

## 2018-12-21 RX ORDER — SODIUM CHLORIDE, SODIUM LACTATE, POTASSIUM CHLORIDE, CALCIUM CHLORIDE 600; 310; 30; 20 MG/100ML; MG/100ML; MG/100ML; MG/100ML
INJECTION, SOLUTION INTRAVENOUS CONTINUOUS
Status: DISCONTINUED | OUTPATIENT
Start: 2018-12-21 | End: 2018-12-21 | Stop reason: HOSPADM

## 2018-12-21 RX ORDER — PROPOFOL 10 MG/ML
VIAL (ML) INTRAVENOUS
Status: DISCONTINUED | OUTPATIENT
Start: 2018-12-21 | End: 2018-12-21

## 2018-12-21 RX ADMIN — SODIUM CHLORIDE, SODIUM LACTATE, POTASSIUM CHLORIDE, AND CALCIUM CHLORIDE: .6; .31; .03; .02 INJECTION, SOLUTION INTRAVENOUS at 08:12

## 2018-12-21 RX ADMIN — PROPOFOL 50 MG: 10 INJECTION, EMULSION INTRAVENOUS at 08:12

## 2018-12-21 RX ADMIN — LIDOCAINE HYDROCHLORIDE 50 MG: 20 INJECTION, SOLUTION EPIDURAL; INFILTRATION; INTRACAUDAL; PERINEURAL at 08:12

## 2018-12-21 RX ADMIN — GLYCOPYRROLATE 0.2 MG: 0.2 INJECTION INTRAMUSCULAR; INTRAVENOUS at 08:12

## 2018-12-21 RX ADMIN — PROPOFOL 150 MG: 10 INJECTION, EMULSION INTRAVENOUS at 08:12

## 2018-12-21 NOTE — [employer] PATIENT INSTRUCTIONS
Discharge Summary/Instructions after an Endoscopic Procedure  Patient Name: Grady Meza  Patient MRN: [id number]  Patient YOB: 1955     Friday, December 21, 2018 Dameon Nguyen MD  RESTRICTIONS:  During your procedure today, you received medications for sedation.  These   medications may affect your judgment, balance and coordination.  Therefore,   for 24 hours, you have the following restrictions:   - DO NOT drive a car, operate machinery, make legal/financial decisions,   sign important papers or drink alcohol.    ACTIVITY:  Today: no heavy lifting, straining or running due to procedural   sedation/anesthesia.  The following day: return to full activity including work.  DIET:  Eat and drink normally unless instructed otherwise.     TREATMENT FOR COMMON SIDE EFFECTS:  - Mild abdominal pain, nausea, belching, bloating or excessive gas:  rest,   eat lightly and use a heating pad.  - Sore Throat: treat with throat lozenges and/or gargle with warm salt   water.  - Because air was used during the procedure, expelling large amounts of air   from your rectum or belching is normal.  - If a bowel prep was taken, you may not have a bowel movement for 1-3 days.    This is normal.  SYMPTOMS TO WATCH FOR AND REPORT TO YOUR PHYSICIAN:  1. Abdominal pain or bloating, other than gas cramps.  2. Chest pain.  3. Back pain.  4. Signs of infection such as: chills or fever occurring within 24 hours   after the procedure.  5. Rectal bleeding, which would show as bright red, maroon, or black stools.   (A tablespoon of blood from the rectum is not serious, especially if   hemorrhoids are present.)  6. Vomiting.  7. Weakness or dizziness.  GO DIRECTLY TO THE NEAREST EMERGENCY ROOM IF YOU HAVE ANY OF THE FOLLOWING:      Difficulty breathing              Chills and/or fever over 101 F   Persistent vomiting and/or vomiting blood   Severe abdominal pain   Severe chest pain   Black, tarry stools   Bleeding- more than one  tablespoon   Any other symptom or condition that you feel may need urgent attention  Your doctor recommends these additional instructions:  If any biopsies were taken, your doctors clinic will contact you in 1 to 2   weeks with any results.  - Discharge patient to home.   - High fiber diet.   - Continue present medications.   - Await pathology results.   - Repeat colonoscopy in 5 years for surveillance based on pathology results.     - Return to primary care physician as previously scheduled.  For questions, problems or results please call your physician Dameon Nguyen MD at Work:  [phone]  If you have any questions about the above instructions, call the GI   department at [phone] or call the endoscopy unit at [phone]   from 7am until 3 pm.  [facility], EMERGENCY ROOM PHONE NUMBER:   [phone]  IF A COMPLICATION OR EMERGENCY SITUATION ARISES AND YOU ARE UNABLE TO REACH   YOUR PHYSICIAN - GO DIRECTLY TO THE EMERGENCY ROOM.  I have read or have had read to me these discharge instructions for my   procedure and have received a written copy.  I understand these   instructions and will follow-up with my physician if I have any questions.     __________________________________       _____________________________________  Nurse Signature                                          Patient/Designated   Responsible Party Signature  MD Dameon Son MD  12/21/2018 8:50:25 AM  This report has been verified and signed electronically.  [employer]

## 2018-12-21 NOTE — PLAN OF CARE
Dr Nguyen came to bedside and discussed findings. NO N/V,  no abdominal pain, no GI bleeding, and vitals stable.  Pt discharged from unit.

## 2018-12-21 NOTE — BRIEF OP NOTE
[facility] -   Brief Operative Note     SUMMARY     Surgery Date: 12/21/2018     Surgeon(s) and Role:     * Dameon Nguyen MD - Primary    Assisting Surgeon: None    Pre-op Diagnosis:  Colon cancer screening [Z12.11]    Post-op Diagnosis:  Post-Op Diagnosis Codes:     * Colon cancer screening [Z12.11]    Procedure(s) (LRB):  COLONOSCOPY (N/A)    Anesthesia: N/A    Description of the findings of the procedure: See Op Note    Findings/Key Components: See Op Note    Estimated Blood Loss: * No values recorded between 12/21/2018 12:00 AM and 12/21/2018  8:51 AM *         Specimens:   Specimen (12h ago, onward)    Start     Ordered    12/21/18 0837  Specimen to Pathology - Surgery  Once     Comments:  #1 Cecal polyp     Start Status   12/21/18 0837 Collected (12/21/18 0849)       12/21/18 0847          Discharge Note    SUMMARY     Admit Date: 12/21/2018    Discharge Date and Time: 12/21/2018 8:51 AM    Hospital Course Patient was seen in the preoperative area by both myself and anesthesia. All consents were verified and all questions appropriately answered. All risks, benefits and alternatives explained to patient. Patient proceeded to endoscopy suite for colonoscopy and was discharged home postoperative once cleared by anesthesia.    Final Diagnosis: Post-Op Diagnosis Codes:     * Colon cancer screening [Z12.11]    Disposition: Home or Self Care    Follow Up/Patient Instructions: See Provation report    Medications:  Reconciled Home Medications:      Medication List      CONTINUE taking these medications    aspirin 81 MG EC tablet  Commonly known as:  ECOTRIN  Take 81 mg by mouth 2 (two) times daily.     mupirocin 2 % ointment  Commonly known as:  BACTROBAN  Apply topically 3 (three) times daily.     sodium,potassium,mag sulfates 17.5-3.13-1.6 gram Solr  Commonly known as:  SUPREP BOWEL PREP KIT  Take as directed     VITAMIN C 1000 MG tablet  Generic drug:  ascorbic acid (vitamin C)  Take 1,000 mg by  mouth once daily.          Discharge Procedure Orders   Diet general     Call MD for:  temperature >100.4     Call MD for:  persistent nausea and vomiting     Call MD for:  severe uncontrolled pain     Call MD for:  difficulty breathing, headache or visual disturbances     Call MD for:  redness, tenderness, or signs of infection (pain, swelling, redness, odor or green/yellow discharge around incision site)     Call MD for:  hives     Call MD for:  persistent dizziness or light-headedness     Call MD for:  extreme fatigue     Activity as tolerated     Follow-up Information     Rena Kate MD.    Specialty:  Family Medicine  Why:  As needed  Contact information:  [address] [place] [address] [address] [address] [phone]

## 2018-12-21 NOTE — ANESTHESIA PREPROCEDURE EVALUATION
12/21/2018  Grady Meza is a 63 y.o., male.    Pre-op Assessment    I have reviewed the Patient Summary Reports.     I have reviewed the Nursing Notes.   I have reviewed the Medications.     Review of Systems  Anesthesia Hx:  No problems with previous Anesthesia  Denies Family Hx of Anesthesia complications.   Denies Personal Hx of Anesthesia complications.   Social:  Non-Smoker, No Alcohol Use    Hematology/Oncology:  Hematology Normal   Oncology Normal     EENT/Dental:EENT/Dental Normal   Cardiovascular:  Cardiovascular Normal Exercise tolerance: good     Pulmonary:  Pulmonary Normal    Renal/:  Renal/ Normal     Hepatic/GI:  Hepatic/GI Normal Bowel Prep. Last drink of water @ 0630   Musculoskeletal:  Musculoskeletal Normal    Neurological:  Neurology Normal    Endocrine:  Endocrine Normal    Dermatological:  Skin Normal        Physical Exam  General:  Well nourished, Obesity    Airway/Jaw/Neck:  Airway Findings: Mallampati: II     Dental:  Dental Findings:             Anesthesia Plan  Type of Anesthesia, risks & benefits discussed:  Anesthesia Type:  MAC  Patient's Preference:   Intra-op Monitoring Plan:   Intra-op Monitoring Plan Comments:   Post Op Pain Control Plan:   Post Op Pain Control Plan Comments:   Induction:   IV  Beta Blocker:  Patient is not currently on a Beta-Blocker (No further documentation required).       Informed Consent: Patient understands risks and agrees with Anesthesia plan.  Questions answered. Anesthesia consent signed with patient.  ASA Score: 1     Day of Surgery Review of History & Physical: I have interviewed and examined the patient. I have reviewed the patient's H&P dated:  There are no significant changes.  H&P update referred to the provider.         Ready For Surgery From Anesthesia Perspective.

## 2018-12-21 NOTE — H&P
[facility] -   Colon and Rectal Surgery  History & Physical    Patient Name: Grady Meza  MRN: [id number]  Admission Date: 12/21/2018  Attending Physician: Dameon Nguyen MD  Primary Care Provider: Rena Kate MD    Patient information was obtained from patient and medical records.    Subjective:     Chief Complaint/Reason for Admission: Here for Colonoscopy    History of Present Illness:  Patient is a 63 y.o. male presents for colonoscopy. Last colonoscopy 5yrs ago with polyps and polyps on colonoscopy before that one as well. Does have intermittent blood when wiping after hard BMs this past year. Denies blood in stool, melena or change in bowel habits. Denies personal or fam hx of CRC or polyps.     No current facility-administered medications on file prior to encounter.      Current Outpatient Medications on File Prior to Encounter   Medication Sig    ascorbic acid, vitamin C, (VITAMIN C) 1000 MG tablet Take 1,000 mg by mouth once daily.    aspirin (ECOTRIN) 81 MG EC tablet Take 81 mg by mouth 2 (two) times daily.    mupirocin (BACTROBAN) 2 % ointment Apply topically 3 (three) times daily.       Review of patient's allergies indicates:  No Known Allergies    Past Medical History:   Diagnosis Date    Allergic rhinitis     Colon polyp     Diverticulosis     Hearing loss     followed by Dr. Joiner    Internal hemorrhoid     SCC (squamous cell carcinoma), face 12/2016    s/p Mohs, Dr. Prather; Derm Dr. Olvera     Past Surgical History:   Procedure Laterality Date    COLONOSCOPY N/A 10/16/2015    Performed by Elio Vasques MD at [facility] ENDO     Family History     Problem Relation (Age of Onset)    Dementia Mother    Diabetes Father    Heart attack Father    Heart disease Father    Obesity Father    Stroke Father        Tobacco Use    Smoking status: Former Smoker     Packs/day: 2.00     Years: 10.00     Pack years: 20.00    Smokeless tobacco: Never Used    Tobacco comment: quit 35  years ago   Substance and Sexual Activity    Alcohol use: No     Alcohol/week: 0.0 oz    Drug use: No    Sexual activity: Not on file     Review of Systems   Constitutional: Negative for activity change, appetite change, chills, fatigue, fever and unexpected weight change.   HENT: Negative for congestion, ear pain, sore throat and trouble swallowing.    Eyes: Negative for pain, redness and itching.   Respiratory: Negative for cough, shortness of breath and wheezing.    Cardiovascular: Negative for chest pain, palpitations and leg swelling.   Gastrointestinal: Negative for abdominal distention, abdominal pain, blood in stool, constipation, diarrhea, nausea, rectal pain and vomiting.   Endocrine: Negative for cold intolerance, heat intolerance and polyuria.   Genitourinary: Negative for dysuria, flank pain, frequency and hematuria.   Musculoskeletal: Negative for gait problem, joint swelling and neck pain.   Skin: Negative for color change, rash and wound.   Allergic/Immunologic: Negative for environmental allergies and immunocompromised state.   Neurological: Negative for dizziness, speech difficulty, weakness and numbness.   Psychiatric/Behavioral: Negative for agitation, confusion and hallucinations.     Objective:     Vital Signs (Most Recent):  Temp: 98.2 °F (36.8 °C) (12/21/18 0754)  Pulse: 95 (12/21/18 0754)  Resp: 18 (12/21/18 0754)  BP: (!) 147/80 (12/21/18 0754)  SpO2: 96 % (12/21/18 0754) Vital Signs (24h Range):  Temp:  [98.2 °F (36.8 °C)] 98.2 °F (36.8 °C)  Pulse:  [95] 95  Resp:  [18] 18  SpO2:  [96 %] 96 %  BP: (147)/(80) 147/80     Weight: 110.2 kg (243 lb)  Body mass index is 33.89 kg/m².    Physical Exam   Constitutional: He is oriented to person, place, and time. He appears well-developed.   HENT:   Head: Normocephalic and atraumatic.   Eyes: Conjunctivae and EOM are normal.   Neck: Normal range of motion. No thyromegaly present.   Cardiovascular: Normal rate and regular rhythm.    Pulmonary/Chest: Effort normal. No respiratory distress.   Abdominal: Soft. He exhibits no distension and no mass. There is no tenderness.   Musculoskeletal: Normal range of motion. He exhibits no edema or tenderness.   Neurological: He is alert and oriented to person, place, and time.   Skin: Skin is warm and dry. Capillary refill takes less than 2 seconds. No rash noted.   Psychiatric: He has a normal mood and affect.         Assessment/Plan:     Patient is a 63 y.o. male who presents for colonoscopy     - Ok to proceed to endoscopy suite for colonoscopy  - Consent obtained. All risks, benefits and alternatives fully explained to patient, including but not limited to bleeding, infection, perforation, and missed polyps. All questions appropriately answered to patient's satisfaction. Consent signed and placed on chart.    There are no hospital problems to display for this patient.    VTE Risk Mitigation (From admission, onward)    None          Dameon Nguyen MD  Colon and Rectal Surgery  [facility] -

## 2018-12-21 NOTE — ANESTHESIA POSTPROCEDURE EVALUATION
"Anesthesia Post Evaluation    Patient: Grady Meza    Procedure(s) Performed: Procedure(s) (LRB):  COLONOSCOPY (N/A)    Final Anesthesia Type: MAC  Patient location during evaluation: PACU  Patient participation: Yes- Able to Participate  Level of consciousness: awake and alert and oriented  Post-procedure vital signs: reviewed and stable  Pain management: adequate  Airway patency: patent  PONV status at discharge: No PONV  Anesthetic complications: no      Cardiovascular status: blood pressure returned to baseline, stable and hemodynamically stable  Respiratory status: unassisted, spontaneous ventilation and room air  Hydration status: euvolemic  Follow-up not needed.        Visit Vitals  BP (!) 124/52   Pulse 96   Temp 36.8 °C (98.2 °F) (Tympanic)   Resp 14   Ht 5' 11" (1.803 m)   Wt 110.2 kg (243 lb)   SpO2 (!) 94%   BMI 33.89 kg/m²       Pain/Virgie Score: Virgie Score: 9 (12/21/2018  8:51 AM)        "

## 2018-12-21 NOTE — DISCHARGE INSTRUCTIONS

## 2019-01-08 ENCOUNTER — TELEPHONE (OUTPATIENT)
Dept: INTERNAL MEDICINE | Facility: CLINIC | Age: 64
End: 2019-01-08

## 2019-01-08 NOTE — TELEPHONE ENCOUNTER
----- Message from Brenda Shah sent at 1/8/2019 12:44 PM CST -----  needs c/b farideh recent results...[phone]

## 2019-01-08 NOTE — TELEPHONE ENCOUNTER
Pt states he is returning a call from Friday no telephone encounter seen in [facility] pt informed.  Verbalized understanding.

## 2019-04-09 ENCOUNTER — LAB VISIT (OUTPATIENT)
Dept: LAB | Facility: HOSPITAL | Age: 64
End: 2019-04-09
Attending: FAMILY MEDICINE
Payer: MEDICARE

## 2019-04-09 DIAGNOSIS — Z13.220 SCREENING FOR LIPOID DISORDERS: ICD-10-CM

## 2019-04-09 DIAGNOSIS — Z00.00 ROUTINE GENERAL MEDICAL EXAMINATION AT A HEALTH CARE FACILITY: ICD-10-CM

## 2019-04-09 LAB
ALBUMIN SERPL BCP-MCNC: 3.8 G/DL (ref 3.5–5.2)
ALP SERPL-CCNC: 77 U/L (ref 55–135)
ALT SERPL W/O P-5'-P-CCNC: 14 U/L (ref 10–44)
ANION GAP SERPL CALC-SCNC: 5 MMOL/L (ref 8–16)
AST SERPL-CCNC: 19 U/L (ref 10–40)
BILIRUB SERPL-MCNC: 0.3 MG/DL (ref 0.1–1)
BUN SERPL-MCNC: 20 MG/DL (ref 8–23)
CALCIUM SERPL-MCNC: 9.4 MG/DL (ref 8.7–10.5)
CHLORIDE SERPL-SCNC: 109 MMOL/L (ref 95–110)
CHOLEST SERPL-MCNC: 163 MG/DL (ref 120–199)
CHOLEST/HDLC SERPL: 4.8 {RATIO} (ref 2–5)
CO2 SERPL-SCNC: 27 MMOL/L (ref 23–29)
CREAT SERPL-MCNC: 1 MG/DL (ref 0.5–1.4)
EST. GFR  (AFRICAN AMERICAN): >60 ML/MIN/1.73 M^2
EST. GFR  (NON AFRICAN AMERICAN): >60 ML/MIN/1.73 M^2
GLUCOSE SERPL-MCNC: 108 MG/DL (ref 70–110)
HDLC SERPL-MCNC: 34 MG/DL (ref 40–75)
HDLC SERPL: 20.9 % (ref 20–50)
LDLC SERPL CALC-MCNC: 107 MG/DL (ref 63–159)
NONHDLC SERPL-MCNC: 129 MG/DL
POTASSIUM SERPL-SCNC: 4.8 MMOL/L (ref 3.5–5.1)
PROT SERPL-MCNC: 7 G/DL (ref 6–8.4)
SODIUM SERPL-SCNC: 141 MMOL/L (ref 136–145)
TRIGL SERPL-MCNC: 110 MG/DL (ref 30–150)

## 2019-04-09 PROCEDURE — 80053 COMPREHEN METABOLIC PANEL: CPT

## 2019-04-09 PROCEDURE — 80061 LIPID PANEL: CPT

## 2019-04-09 PROCEDURE — 36415 COLL VENOUS BLD VENIPUNCTURE: CPT

## 2019-09-13 ENCOUNTER — PATIENT MESSAGE (OUTPATIENT)
Dept: INTERNAL MEDICINE | Facility: CLINIC | Age: 64
End: 2019-09-13

## 2019-09-19 ENCOUNTER — PATIENT OUTREACH (OUTPATIENT)
Dept: ADMINISTRATIVE | Facility: HOSPITAL | Age: 64
End: 2019-09-19

## 2019-09-27 ENCOUNTER — LAB VISIT (OUTPATIENT)
Dept: LAB | Facility: HOSPITAL | Age: 64
End: 2019-09-27
Attending: FAMILY MEDICINE
Payer: MEDICARE

## 2019-09-27 DIAGNOSIS — Z00.00 ROUTINE GENERAL MEDICAL EXAMINATION AT A HEALTH CARE FACILITY: ICD-10-CM

## 2019-09-27 DIAGNOSIS — Z13.29 THYROID DISORDER SCREEN: ICD-10-CM

## 2019-09-27 DIAGNOSIS — Z13.220 SCREENING FOR LIPOID DISORDERS: ICD-10-CM

## 2019-09-27 LAB
ALBUMIN SERPL BCP-MCNC: 4.1 G/DL (ref 3.5–5.2)
ALP SERPL-CCNC: 73 U/L (ref 55–135)
ALT SERPL W/O P-5'-P-CCNC: 11 U/L (ref 10–44)
ANION GAP SERPL CALC-SCNC: 9 MMOL/L (ref 8–16)
AST SERPL-CCNC: 15 U/L (ref 10–40)
BASOPHILS # BLD AUTO: 0.05 K/UL (ref 0–0.2)
BASOPHILS NFR BLD: 0.5 % (ref 0–1.9)
BILIRUB SERPL-MCNC: 0.8 MG/DL (ref 0.1–1)
BUN SERPL-MCNC: 17 MG/DL (ref 8–23)
CALCIUM SERPL-MCNC: 9.4 MG/DL (ref 8.7–10.5)
CHLORIDE SERPL-SCNC: 107 MMOL/L (ref 95–110)
CHOLEST SERPL-MCNC: 205 MG/DL (ref 120–199)
CHOLEST/HDLC SERPL: 6.4 {RATIO} (ref 2–5)
CO2 SERPL-SCNC: 24 MMOL/L (ref 23–29)
CREAT SERPL-MCNC: 1.1 MG/DL (ref 0.5–1.4)
DIFFERENTIAL METHOD: ABNORMAL
EOSINOPHIL # BLD AUTO: 0.1 K/UL (ref 0–0.5)
EOSINOPHIL NFR BLD: 1.1 % (ref 0–8)
ERYTHROCYTE [DISTWIDTH] IN BLOOD BY AUTOMATED COUNT: 13.2 % (ref 11.5–14.5)
EST. GFR  (AFRICAN AMERICAN): >60 ML/MIN/1.73 M^2
EST. GFR  (NON AFRICAN AMERICAN): >60 ML/MIN/1.73 M^2
GLUCOSE SERPL-MCNC: 105 MG/DL (ref 70–110)
HCT VFR BLD AUTO: 46.6 % (ref 40–54)
HDLC SERPL-MCNC: 32 MG/DL (ref 40–75)
HDLC SERPL: 15.6 % (ref 20–50)
HGB BLD-MCNC: 15.5 G/DL (ref 14–18)
IMM GRANULOCYTES # BLD AUTO: 0.04 K/UL (ref 0–0.04)
IMM GRANULOCYTES NFR BLD AUTO: 0.4 % (ref 0–0.5)
LDLC SERPL CALC-MCNC: 138.6 MG/DL (ref 63–159)
LYMPHOCYTES # BLD AUTO: 1.5 K/UL (ref 1–4.8)
LYMPHOCYTES NFR BLD: 15.3 % (ref 18–48)
MCH RBC QN AUTO: 30.7 PG (ref 27–31)
MCHC RBC AUTO-ENTMCNC: 33.3 G/DL (ref 32–36)
MCV RBC AUTO: 92 FL (ref 82–98)
MONOCYTES # BLD AUTO: 0.7 K/UL (ref 0.3–1)
MONOCYTES NFR BLD: 7.4 % (ref 4–15)
NEUTROPHILS # BLD AUTO: 7.4 K/UL (ref 1.8–7.7)
NEUTROPHILS NFR BLD: 75.3 % (ref 38–73)
NONHDLC SERPL-MCNC: 173 MG/DL
NRBC BLD-RTO: 0 /100 WBC
PLATELET # BLD AUTO: 309 K/UL (ref 150–350)
PMV BLD AUTO: 10.2 FL (ref 9.2–12.9)
POTASSIUM SERPL-SCNC: 4.7 MMOL/L (ref 3.5–5.1)
PROT SERPL-MCNC: 7.6 G/DL (ref 6–8.4)
RBC # BLD AUTO: 5.05 M/UL (ref 4.6–6.2)
SODIUM SERPL-SCNC: 140 MMOL/L (ref 136–145)
TRIGL SERPL-MCNC: 172 MG/DL (ref 30–150)
TSH SERPL DL<=0.005 MIU/L-ACNC: 2 UIU/ML (ref 0.4–4)
WBC # BLD AUTO: 9.8 K/UL (ref 3.9–12.7)

## 2019-09-27 PROCEDURE — 80061 LIPID PANEL: CPT

## 2019-09-27 PROCEDURE — 80053 COMPREHEN METABOLIC PANEL: CPT

## 2019-09-27 PROCEDURE — 85025 COMPLETE CBC W/AUTO DIFF WBC: CPT

## 2019-09-27 PROCEDURE — 36415 COLL VENOUS BLD VENIPUNCTURE: CPT

## 2019-09-27 PROCEDURE — 84443 ASSAY THYROID STIM HORMONE: CPT

## 2019-10-03 ENCOUNTER — OFFICE VISIT (OUTPATIENT)
Dept: INTERNAL MEDICINE | Facility: CLINIC | Age: 64
End: 2019-10-03
Payer: MEDICARE

## 2019-10-03 VITALS
SYSTOLIC BLOOD PRESSURE: 114 MMHG | BODY MASS INDEX: 33.89 KG/M2 | HEIGHT: 71 IN | OXYGEN SATURATION: 97 % | WEIGHT: 242.06 LBS | HEART RATE: 97 BPM | TEMPERATURE: 98 F | DIASTOLIC BLOOD PRESSURE: 70 MMHG

## 2019-10-03 DIAGNOSIS — Z13.6 SCREENING FOR AAA (ABDOMINAL AORTIC ANEURYSM): Primary | ICD-10-CM

## 2019-10-03 DIAGNOSIS — E78.2 MIXED HYPERLIPIDEMIA: ICD-10-CM

## 2019-10-03 PROCEDURE — 99214 OFFICE O/P EST MOD 30 MIN: CPT | Mod: PBBFAC | Performed by: FAMILY MEDICINE

## 2019-10-03 PROCEDURE — 99999 PR PBB SHADOW E&M-EST. PATIENT-LVL IV: ICD-10-PCS | Mod: PBBFAC,,, | Performed by: FAMILY MEDICINE

## 2019-10-03 PROCEDURE — 99999 PR PBB SHADOW E&M-EST. PATIENT-LVL IV: CPT | Mod: PBBFAC,,, | Performed by: FAMILY MEDICINE

## 2019-10-03 PROCEDURE — 99214 OFFICE O/P EST MOD 30 MIN: CPT | Mod: S$PBB,,, | Performed by: FAMILY MEDICINE

## 2019-10-03 PROCEDURE — 99214 PR OFFICE/OUTPT VISIT, EST, LEVL IV, 30-39 MIN: ICD-10-PCS | Mod: S$PBB,,, | Performed by: FAMILY MEDICINE

## 2019-10-03 RX ORDER — AMOXICILLIN 500 MG
CAPSULE ORAL DAILY
COMMUNITY

## 2019-10-03 NOTE — PATIENT INSTRUCTIONS
DIET: LOW CHOLESTEROL  Cholesterol is needed by the body to build new cells and create certain hormones. There are two kinds of cholesterol in the blood:      · Good cholesterol prevents fat deposits (plaque) from building up in the arteries. In this way it protects against heart disease and stroke.    · Bad cholesterol stays in the body and sticks to artery walls. It may eventually block blood flow to the heart and brain causing heart attack or stroke.   75% of the bodys cholesterol is made in the liver. Only 25% of the bodys cholesterol comes from the food you eat. While the amount of cholesterol in your diet should be limited, it is the cholesterol that your body makes that creates the greatest disease risk. The biggest influence on cholesterol made by your body is the mixture of fat types in your diet. There are two kinds of fats you can eat:  · Good Fats are the unsaturated fats (mono-saturated and poly-unsaturated). They raise the level of good cholesterol and lower the level of bad cholesterol. Good fats are found in vegetable oils such as olive, sunflower, corn and soybean oils, and in nuts and seeds.    · Bad Fats are the saturated fats (including foods high in cholesterol) and trans fats. These increase the risk of disease. They lower the good cholesterol and raise the level of bad cholesterol. Bad fats are found in meat and whole-milk dairy products. Some plants are also high in bad fats (coconut and palm plants). Trans fats are found in hard (stick) margarines and many fast foods and commercially baked goods. Soft margarine sold in tubs has less trans fats and are safer to use.  High blood cholesterol usually is a result of a diet high in saturated fat combined with an inactive lifestyle. In some cases, genetics plays a role in causing high cholesterol. The following tips will help you create healthy eating habits that will help lower your blood cholesterol level.  STEPS TO CREATING A DIET  HIGH IN GOOD FAT, LOW IN BAD FATS (and low in cholesterol)  1. Consult with your doctor before starting a low cholesterol diet or weight loss program.    2. Learn to read nutrition labels and select appropriate portion sizes.    3. When cooking, use plant-based unsaturated vegetable oils (sunflower, corn, soybean, canola, peanut, and olive oils).    4. Avoid saturated oils found in animal products such as meat, dairy (whole-milk, cheese and ice cream), poultry skin, and egg yolks. Plants high in saturated oils include coconut and coconut oil, palm oil and palm kernel oil.  5. If you eat meat, choose smaller portions and lean cuts.  6. Replace meat with fish at least two times a week. Fish is an important source of the unsaturated fat called omega-3 fatty acids. This fat has potential to lower the risk of heart disease.    7. Replace whole-milk dairy products with low-fat or nonfat products. Try soy products. Soy helps to reduce total cholesterol.    8. Supplement your diet with protective fibers. Eat nuts, seeds, and whole grains rather than white rice and bread. These foods lower both cholesterol and triglyceride levels. (Triglycerides are another fat found in the blood.) Walnuts are one of the best sources of an omega-3 fatty acid.  9. Eat plenty of fresh fruits and vegetables daily.  10. Avoid fast foods and commercial baked goods. Assume they contain saturated fat unless labeled otherwise.  © 4504-2915 [employer], [address] [place] [address], [place] [address]. All rights reserved. This information is not intended as a substitute for professional medical care. Always follow your healthcare professional's instructions.

## 2019-10-03 NOTE — ASSESSMENT & PLAN NOTE
Uncontrolled; discussed ASCVD risk score; patient opts for lifestyle changes vs. Statin; expressed understanding of risks/benefits    The 10-year ASCVD risk score (Kary JORGENSEN Jr., et al., 2013) is: 13.8%    Values used to calculate the score:      Age: 63 years      Sex: Male      Is Non- : No      Diabetic: No      Tobacco smoker: No      Systolic Blood Pressure: 114 mmHg      Is BP treated: No      HDL Cholesterol: 32 mg/dL      Total Cholesterol: 205 mg/dL

## 2019-10-03 NOTE — PROGRESS NOTES
Subjective:       Patient ID: Grady Meza is a 63 y.o. male.    Chief Complaint: Annual Exam    Patient presents to clinic today for annual physical exam.    Review of Systems   Constitutional: Negative for activity change and unexpected weight change.   HENT: Positive for hearing loss. Negative for rhinorrhea and trouble swallowing.         Followed by ENT     Eyes: Negative for discharge and visual disturbance.   Respiratory: Negative for chest tightness and wheezing.    Cardiovascular: Negative for chest pain and palpitations.   Gastrointestinal: Negative for blood in stool, constipation, diarrhea and vomiting.   Endocrine: Negative for polydipsia and polyuria.   Genitourinary: Negative for difficulty urinating, hematuria and urgency.   Musculoskeletal: Negative for arthralgias, joint swelling and neck pain.   Neurological: Negative for weakness and headaches.   Psychiatric/Behavioral: Negative for confusion and dysphoric mood.       Objective:      Physical Exam   Constitutional: He is oriented to person, place, and time. He appears well-developed and well-nourished. No distress.   HENT:   Head: Normocephalic and atraumatic.   Right Ear: Hearing, tympanic membrane, external ear and ear canal normal.   Left Ear: Hearing, tympanic membrane, external ear and ear canal normal.   Nose: Nose normal.   Mouth/Throat: Uvula is midline, oropharynx is clear and moist and mucous membranes are normal.   Eyes: Pupils are equal, round, and reactive to light. Conjunctivae, EOM and lids are normal. No scleral icterus.   Neck: Normal range of motion. Neck supple. No thyromegaly present.   Cardiovascular: Normal rate and regular rhythm. Exam reveals no gallop and no friction rub.   No murmur heard.  Pulmonary/Chest: Effort normal and breath sounds normal. He has no wheezes. He has no rales.   Abdominal: Soft. Bowel sounds are normal. He exhibits no distension and no mass. There is no hepatosplenomegaly. There is no tenderness.    Musculoskeletal: Normal range of motion. He exhibits no edema or tenderness.   Lymphadenopathy:     He has no cervical adenopathy.   Neurological: He is alert and oriented to person, place, and time. No cranial nerve deficit. Coordination normal.   Reflex Scores:       Patellar reflexes are 2+ on the right side and 2+ on the left side.  Skin: Skin is warm and dry. No rash noted.   Psychiatric: He has a normal mood and affect.   Vitals reviewed.      Assessment:       1. Screening for AAA (abdominal aortic aneurysm)    2. Mixed hyperlipidemia        Plan:     Problem List Items Addressed This Visit     Mixed hyperlipidemia    Current Assessment & Plan     Uncontrolled; discussed ASCVD risk score; patient opts for lifestyle changes vs. Statin; expressed understanding of risks/benefits    The 10-year ASCVD risk score (Kary JORGENSEN Jr., et al., 2013) is: 13.8%    Values used to calculate the score:      Age: 63 years      Sex: Male      Is Non- : No      Diabetic: No      Tobacco smoker: No      Systolic Blood Pressure: 114 mmHg      Is BP treated: No      HDL Cholesterol: 32 mg/dL      Total Cholesterol: 205 mg/dL             Other Visit Diagnoses     Screening for AAA (abdominal aortic aneurysm)    -  Primary    Relevant Orders    US AAA Screening          Health Maintenance reviewed/updated. Declines flu, pneumonia and shingrix vaccine.  Discussed risks/benefits of vaccinations, patient expressed understanding and still declines.  Patient viewed [facility] video on prostate cancer risks/benefits.  After viewing he declines screening at this time.

## 2019-10-04 ENCOUNTER — TELEPHONE (OUTPATIENT)
Dept: INTERNAL MEDICINE | Facility: CLINIC | Age: 64
End: 2019-10-04

## 2019-10-04 NOTE — TELEPHONE ENCOUNTER
He watched a video on risks/benefits of prostate cancer screening, he verbalized that he did not want to proceed with prostate cancer screening, I did not order PSA.     AAA screen is to screen for abdominal aortic aneurysm - this is a one time screen that looks for enlargement of aorta (big vessel in the abdomen). If these are not detected they can rupture and lead to death. If he has further questions let me know. If he is not sure he wants to proceed, we can always discuss it again in the future.

## 2019-10-04 NOTE — TELEPHONE ENCOUNTER
----- Message from Lucia Rain sent at 10/4/2019  3:12 PM CDT -----  Contact: Patient   Patient requesting a call back to discuss why US is scheduled.Please call back at [phone].      Thanks,  Lucia Rain

## 2019-10-04 NOTE — TELEPHONE ENCOUNTER
This is a one time screen for AAA recommended for men age 65-75. I'm pretty sure we discussed it. If he has questions let me know. If he declines let me know so I can document.

## 2019-10-04 NOTE — TELEPHONE ENCOUNTER
Spoke with pt wants to know why the AAA us was ordered? States he does not remember going over this at the appt yesterday.

## 2020-07-17 DIAGNOSIS — Z71.89 COMPLEX CARE COORDINATION: ICD-10-CM

## 2020-10-06 ENCOUNTER — PATIENT MESSAGE (OUTPATIENT)
Dept: ADMINISTRATIVE | Facility: HOSPITAL | Age: 65
End: 2020-10-06

## 2021-02-11 ENCOUNTER — PATIENT OUTREACH (OUTPATIENT)
Dept: ADMINISTRATIVE | Facility: HOSPITAL | Age: 66
End: 2021-02-11

## 2021-03-10 ENCOUNTER — PES CALL (OUTPATIENT)
Dept: ADMINISTRATIVE | Facility: CLINIC | Age: 66
End: 2021-03-10

## 2021-03-25 ENCOUNTER — PATIENT MESSAGE (OUTPATIENT)
Dept: ADMINISTRATIVE | Facility: HOSPITAL | Age: 66
End: 2021-03-25

## 2021-04-14 ENCOUNTER — PES CALL (OUTPATIENT)
Dept: ADMINISTRATIVE | Facility: CLINIC | Age: 66
End: 2021-04-14

## 2021-04-22 ENCOUNTER — PATIENT MESSAGE (OUTPATIENT)
Dept: ADMINISTRATIVE | Facility: HOSPITAL | Age: 66
End: 2021-04-22

## 2021-05-12 ENCOUNTER — TELEPHONE (OUTPATIENT)
Dept: ADMINISTRATIVE | Facility: HOSPITAL | Age: 66
End: 2021-05-12

## 2021-07-12 ENCOUNTER — PATIENT MESSAGE (OUTPATIENT)
Dept: ADMINISTRATIVE | Facility: HOSPITAL | Age: 66
End: 2021-07-12

## 2021-10-04 ENCOUNTER — PATIENT MESSAGE (OUTPATIENT)
Dept: ADMINISTRATIVE | Facility: HOSPITAL | Age: 66
End: 2021-10-04

## 2022-03-15 ENCOUNTER — PES CALL (OUTPATIENT)
Dept: ADMINISTRATIVE | Facility: CLINIC | Age: 67
End: 2022-03-15
Payer: MEDICARE

## 2022-04-27 ENCOUNTER — PATIENT MESSAGE (OUTPATIENT)
Dept: ADMINISTRATIVE | Facility: HOSPITAL | Age: 67
End: 2022-04-27
Payer: MEDICARE

## 2022-07-19 ENCOUNTER — PATIENT OUTREACH (OUTPATIENT)
Dept: ADMINISTRATIVE | Facility: HOSPITAL | Age: 67
End: 2022-07-19
Payer: MEDICARE

## 2022-07-26 ENCOUNTER — PES CALL (OUTPATIENT)
Dept: ADMINISTRATIVE | Facility: CLINIC | Age: 67
End: 2022-07-26
Payer: MEDICARE

## 2022-10-18 ENCOUNTER — PATIENT OUTREACH (OUTPATIENT)
Dept: ADMINISTRATIVE | Facility: HOSPITAL | Age: 67
End: 2022-10-18
Payer: MEDICARE

## 2023-06-14 NOTE — ANESTHESIA RELEASE NOTE
"Anesthesia Release from PACU Note    Patient: Grady Meza    Procedure(s) Performed: Procedure(s) (LRB):  COLONOSCOPY (N/A)    Anesthesia type: MAC    Post pain: Adequate analgesia    Post assessment: no apparent anesthetic complications, tolerated procedure well and no evidence of recall    Last Vitals:   Visit Vitals  BP (!) 124/52   Pulse 96   Temp 36.8 °C (98.2 °F) (Tympanic)   Resp 14   Ht 5' 11" (1.803 m)   Wt 110.2 kg (243 lb)   SpO2 (!) 94%   BMI 33.89 kg/m²       Post vital signs: stable    Level of consciousness: awake, alert  and oriented    Nausea/Vomiting: no nausea/no vomiting    Complications: none    Airway Patency: patent    Respiratory: unassisted, spontaneous ventilation, room air    Cardiovascular: stable and blood pressure at baseline    Hydration: euvolemic  "
(4) History of Falls or Infant-Toddler Placed in Bed